# Patient Record
Sex: MALE | Race: WHITE
[De-identification: names, ages, dates, MRNs, and addresses within clinical notes are randomized per-mention and may not be internally consistent; named-entity substitution may affect disease eponyms.]

---

## 2018-12-12 ENCOUNTER — HOSPITAL ENCOUNTER (OUTPATIENT)
Dept: HOSPITAL 92 - SCSULT | Age: 23
Discharge: HOME | End: 2018-12-12
Payer: COMMERCIAL

## 2018-12-12 DIAGNOSIS — N50.89: ICD-10-CM

## 2018-12-12 DIAGNOSIS — N50.811: Primary | ICD-10-CM

## 2018-12-12 PROCEDURE — 93976 VASCULAR STUDY: CPT

## 2018-12-12 PROCEDURE — 76870 US EXAM SCROTUM: CPT

## 2018-12-12 NOTE — ULT
ULTRASOUND TESTICULAR WITH DOPPLER:

 

HISTORY: 

Right testicular pain.  Palpable area.

 

COMPARISON: 

None.

 

TECHNIQUE: 

Real-time, gray scale, and color evaluation of these vessels was performed.  The right testicle measu
res 2.8 x 5.5 x 2 cm and the left testicle measures 3.1 x 4.6 x 2.3 cm.  The right epididymis measure
s 1.3 x 1.3 cm and the left measures 1.1 x 0.9 cm.  

 

No underlying testicular mass.  Normal vascularity.

 

IMPRESSION: 

Normal exam.

 

POS: [facility]